# Patient Record
Sex: FEMALE | Race: WHITE | Employment: OTHER | URBAN - METROPOLITAN AREA
[De-identification: names, ages, dates, MRNs, and addresses within clinical notes are randomized per-mention and may not be internally consistent; named-entity substitution may affect disease eponyms.]

---

## 2020-10-25 ENCOUNTER — HOSPITAL ENCOUNTER (OUTPATIENT)
Age: 60
Discharge: HOME | End: 2020-10-25
Attending: EMERGENCY MEDICINE
Payer: COMMERCIAL

## 2020-10-25 VITALS
HEART RATE: 71 BPM | SYSTOLIC BLOOD PRESSURE: 117 MMHG | OXYGEN SATURATION: 98 % | DIASTOLIC BLOOD PRESSURE: 70 MMHG | TEMPERATURE: 97 F | RESPIRATION RATE: 18 BRPM

## 2020-10-25 DIAGNOSIS — Z20.822 EXPOSURE TO COVID-19 VIRUS: ICD-10-CM

## 2020-10-25 DIAGNOSIS — Z20.822 ENCOUNTER FOR LABORATORY TESTING FOR COVID-19 VIRUS: Primary | ICD-10-CM

## 2020-10-25 PROCEDURE — 99202 OFFICE O/P NEW SF 15 MIN: CPT | Performed by: EMERGENCY MEDICINE

## 2020-10-25 NOTE — ED PROVIDER NOTES
Patient Seen in: [facility]      History   Patient presents with:  Testing    Stated Complaint: covid exposure    HPI  Patient was around her 80-year-old mother-in-law who had diarrhea and was found to be Covid positive.   Her last contact wit Cardiovascular:      Rate and Rhythm: Normal rate and regular rhythm. Pulses: Normal pulses. Heart sounds: Normal heart sounds. Pulmonary:      Effort: Pulmonary effort is normal.      Breath sounds: Normal breath sounds.    Abdominal:      Pa

## 2024-12-18 ENCOUNTER — APPOINTMENT (OUTPATIENT)
Dept: GENERAL RADIOLOGY | Age: 64
End: 2024-12-18
Attending: NURSE PRACTITIONER
Payer: COMMERCIAL

## 2024-12-18 ENCOUNTER — HOSPITAL ENCOUNTER (OUTPATIENT)
Age: 64
Discharge: HOME | End: 2024-12-18
Payer: COMMERCIAL

## 2024-12-18 VITALS
SYSTOLIC BLOOD PRESSURE: 125 MMHG | OXYGEN SATURATION: 97 % | RESPIRATION RATE: 18 BRPM | HEART RATE: 78 BPM | DIASTOLIC BLOOD PRESSURE: 67 MMHG | TEMPERATURE: 98 F

## 2024-12-18 DIAGNOSIS — S69.91XA INJURY OF RIGHT WRIST, INITIAL ENCOUNTER: Primary | ICD-10-CM

## 2024-12-18 DIAGNOSIS — S62.114A CLOSED NONDISPLACED FRACTURE OF TRIQUETRUM OF RIGHT WRIST, INITIAL ENCOUNTER: ICD-10-CM

## 2024-12-18 PROCEDURE — 73110 X-RAY EXAM OF WRIST: CPT | Performed by: NURSE PRACTITIONER

## 2024-12-19 ENCOUNTER — TELEPHONE (OUTPATIENT)
Dept: ORTHOPEDICS CLINIC | Facility: CLINIC | Age: 64
End: 2024-12-19

## 2024-12-19 NOTE — ED PROVIDER NOTES
Patient Seen in: [facility]      History     Chief Complaint   Patient presents with    Pain     Stated Complaint: right arm injury    Subjective:   HPI      This is a 64-year-old female with history of hyperlipidemia presenting with a wrist injury.  Patient states on 11/23/2024 she sustained a right wrist injury where she slammed it down since then has been having pain went to [employer] and bought a wrist splint which she has been wearing but still has the pain with movement and touch so came in to be evaluated.  Denies numbness or tingling in the extremities but feels pain in the wrist that radiates up toward the elbow but does not have elbow or shoulder pain.  Right-hand-dominant    Objective:     No pertinent past medical history.            No pertinent past surgical history.              No pertinent social history.            Review of Systems    Positive for stated complaint: right arm injury  Other systems are as noted in HPI.  Constitutional and vital signs reviewed.      All other systems reviewed and negative except as noted above.    Physical Exam     ED Triage Vitals [12/18/24 1820]   /67   Pulse 78   Resp 18   Temp 98.2 °F (36.8 °C)   Temp src Oral   SpO2 97 %   O2 Device None (Room air)       Current Vitals:   Vital Signs  BP: 125/67  Pulse: 78  Resp: 18  Temp: 98.2 °F (36.8 °C)  Temp src: Oral    Oxygen Therapy  SpO2: 97 %  O2 Device: None (Room air)        Physical Exam  Vitals and nursing note reviewed.   Constitutional:       Appearance: Normal appearance.   HENT:      Right Ear: External ear normal.      Left Ear: External ear normal.      Nose: Nose normal.      Mouth/Throat:      Mouth: Mucous membranes are moist.      Pharynx: Oropharynx is clear.   Eyes:      Conjunctiva/sclera: Conjunctivae normal.   Musculoskeletal:         General: Normal range of motion.        Arms:       Cervical back: Normal range of motion.   Skin:     General: Skin is warm.      Capillary Refill:  Capillary refill takes less than 2 seconds.   Neurological:      General: No focal deficit present.      Mental Status: She is alert and oriented to person, place, and time.             ED Course   Labs Reviewed - No data to display       Narrative   PROCEDURE: XR WRIST COMPLETE (MIN 3 VIEWS), RIGHT (CPT=73110)     COMPARISON: None.     INDICATIONS: Anterior right wrist pain post fall 11/23/24.     TECHNIQUE: Three-view  Findings and impression:       Transverse nondisplaced lucency through the triquetrum seen on the frontal and oblique views.  Lateral view shows dorsal soft tissue swelling.  This is consistent with triquetral fracture        Finalized by (CST): Bob Valdez MD on 12/18/2024 at 6:39 PM                  MDM           Medical Decision Making  64-year-old female well-appearing nontoxic presenting with a wrist injury.  DDx contusion of the wrist versus fracture versus sprain x-ray will be ordered of the wrist.    X-ray independently viewed by this provider concerning for a fracture    Discussed results with the patient discussed fracture of the wrist and application of a short arm OCL and sling and following up with Ortho resource will be provided.  Discussed Tylenol for pain or discomfort and strict ER precautions with any new or worsening symptoms.  Patient is agreeable with the plan of care and acknowledges understanding discharge instructions.    Procedure: Right upper extremity short arm OCL and sling applied pre and post application neurovascularly intact    Problems Addressed:  Closed nondisplaced fracture of triquetrum of right wrist, initial encounter: acute illness or injury  Injury of right wrist, initial encounter: acute illness or injury    Amount and/or Complexity of Data Reviewed  Radiology: ordered and independent interpretation performed. Decision-making details documented in ED Course.    Risk  OTC drugs.        Disposition and Plan     Clinical Impression:  1. Injury of right wrist,  initial encounter    2. Closed nondisplaced fracture of triquetrum of right wrist, initial encounter         Disposition:  Discharge  12/18/2024  7:08 pm    Follow-up:  Lavell Samson MD  [address],  [place] [address] [phone]      Resource for orthopedic specialist to follow-up with call take the first available appointment    Matthew Cisneros  [address] [place] [address] [id number] [phone]      As needed          Medications Prescribed:  There are no discharge medications for this patient.          Supplementary Documentation:

## 2024-12-19 NOTE — TELEPHONE ENCOUNTER
Can you see this patient?  If so when?  Please advise.  Thank you!      DOI  11/23/24    XR 12/18/24  Transverse nondisplaced lucency through the triquetrum seen on the frontal and oblique views.  Lateral view shows dorsal soft tissue swelling.  This is consistent with triquetral fracture         NP, referred by urgent care for R wrist fracture, xrays completed on 12/18/24.    Patient requesting Dr. Samson.    Please advise, thanks.

## 2024-12-19 NOTE — TELEPHONE ENCOUNTER
NP, referred by urgent care for R wrist fracture, xrays completed on 12/18/24.    Patient requesting Dr. Samson.    Please advise, thanks.

## 2024-12-19 NOTE — TELEPHONE ENCOUNTER
Please help patient get scheduled w/Dr. Daniel sometime next week.   No imaging needed per Dr. Daniel.

## 2024-12-19 NOTE — DISCHARGE INSTRUCTIONS
Take over-the-counter Tylenol as needed for pain.  The temporary splint that will be applied cannot be removed until you see the orthopedic specialist if you shower large plastic bag over the arm so that is not get wet during the day when you are up and walking wear the sling at night sling is off sleep with arm on a pillow.  If fingertips are blue and not pink cannot feel sensation have severe swelling numbness cannot feel the extremity or any new or worsening symptoms go to the nearest emergency department.